# Patient Record
Sex: FEMALE | Race: WHITE
[De-identification: names, ages, dates, MRNs, and addresses within clinical notes are randomized per-mention and may not be internally consistent; named-entity substitution may affect disease eponyms.]

---

## 2019-02-08 ENCOUNTER — HOSPITAL ENCOUNTER (OUTPATIENT)
Dept: HOSPITAL 88 - MAMMO | Age: 69
End: 2019-02-08
Attending: INTERNAL MEDICINE
Payer: COMMERCIAL

## 2019-02-08 DIAGNOSIS — M47.27: ICD-10-CM

## 2019-02-08 DIAGNOSIS — Z12.31: Primary | ICD-10-CM

## 2019-02-08 PROCEDURE — 77067 SCR MAMMO BI INCL CAD: CPT

## 2019-02-08 PROCEDURE — 72110 X-RAY EXAM L-2 SPINE 4/>VWS: CPT

## 2019-02-08 NOTE — DIAGNOSTIC IMAGING REPORT
EXAM: Lumbar spine radiographs - 5 views



INDICATION: Mid/low back pain x 2 months.



COMPARISON: None



FINDINGS:

BONES:

There is diffuse osteopenia.

There is minimal retrolisthesis of L3 on L4.

No acute displaced fractures.

Vertebral body heights are preserved. 



DISCS AND JOINTS:

Mild degenerative disc and facet degenerative changes, most pronounced at

L5-S1. 



SOFT TISSUES:

Atherosclerotic calcifications are noted. 



IMPRESSION:

No acute radiographic abnormality. 



Mild degenerative disc and facet degenerative changes in lower lumbar spine.



Diffuse osteopenia.



Signed by: Dr. Rell Law MD on 2/8/2019 11:13 AM

## 2019-08-23 ENCOUNTER — HOSPITAL ENCOUNTER (OUTPATIENT)
Dept: HOSPITAL 88 - CT | Age: 69
End: 2019-08-23
Attending: INTERNAL MEDICINE
Payer: COMMERCIAL

## 2019-08-23 DIAGNOSIS — R10.11: Primary | ICD-10-CM

## 2019-08-23 PROCEDURE — 74150 CT ABDOMEN W/O CONTRAST: CPT

## 2019-08-23 NOTE — DIAGNOSTIC IMAGING REPORT
EXAM: CT Abdomen without IV contrast



INDICATION: Right upper quadrant pain



COMPARISON: None.



TECHNIQUE: The abdomen scanned utilizing a multidetector helical scanner from

the lung base to the iliac crest without contrast. Coronal and sagittal

reformations were obtained. Routine protocol was performed.



RADIATION DOSE:

Total DLP:  129 mGy*cm



Dose modulation, iterative reconstruction, and/or weight based adjustment of

the mA/kV was utilized to reduce the radiation dose to as low as reasonably

achievable. 



FINDINGS:

LOWER THORAX: The lung bases are clear.



HEPATOBILIARY: No focal hepatic lesions.  No biliary ductal dilatation. The

gallbladder is not visualized



SPLEEN: No splenomegaly.



PANCREAS: No focal masses or ductal dilatation.



ADRENALS: No adrenal nodules.

KIDNEYS/URETERS: No hydronephrosis, stones, or solid mass lesions.





PERITONEUM / RETROPERITONEUM: No free air or fluid.

LYMPH NODES: No lymphadenopathy.

VESSELS: There are moderate atherosclerotic calcifications of the abdominal

aorta and iliac vessels.



GI TRACT: The visualized portions of the gastrointestinal tract demonstrate no

evidence of distention or wall thickening.



BONES AND SOFT TISSUES: Unremarkable.



IMPRESSION: 

No acute findings in the abdomen to explain the patient's right upper quadrant

pain.



Signed by: Stephen E Dreyer, MD on 8/23/2019 10:15 AM

## 2019-08-27 ENCOUNTER — HOSPITAL ENCOUNTER (OUTPATIENT)
Dept: HOSPITAL 88 - OR | Age: 69
Discharge: HOME | End: 2019-08-27
Attending: INTERNAL MEDICINE
Payer: COMMERCIAL

## 2019-08-27 VITALS — SYSTOLIC BLOOD PRESSURE: 153 MMHG | DIASTOLIC BLOOD PRESSURE: 81 MMHG

## 2019-08-27 DIAGNOSIS — K21.9: ICD-10-CM

## 2019-08-27 DIAGNOSIS — Z85.3: ICD-10-CM

## 2019-08-27 DIAGNOSIS — K20.9: ICD-10-CM

## 2019-08-27 DIAGNOSIS — Z88.8: ICD-10-CM

## 2019-08-27 DIAGNOSIS — Z88.6: ICD-10-CM

## 2019-08-27 DIAGNOSIS — Z86.19: ICD-10-CM

## 2019-08-27 DIAGNOSIS — I10: ICD-10-CM

## 2019-08-27 DIAGNOSIS — K29.70: Primary | ICD-10-CM

## 2019-08-27 DIAGNOSIS — K44.9: ICD-10-CM

## 2019-08-27 DIAGNOSIS — Z01.810: ICD-10-CM

## 2019-08-27 DIAGNOSIS — K59.09: ICD-10-CM

## 2019-08-27 PROCEDURE — 93005 ELECTROCARDIOGRAM TRACING: CPT

## 2019-08-27 PROCEDURE — 43239 EGD BIOPSY SINGLE/MULTIPLE: CPT

## 2019-08-27 NOTE — OPERATIVE REPORT
DATE OF PROCEDURE:  08/27/2019

 

SURGEON:  Bravo White MD

 

PROCEDURE:  EGD with biopsies.

 

INDICATIONS FOR EGD:  Upper abdominal pain, bloating.

 

MEDICATIONS:  The patient was done under MAC, please see anesthesiologist's note.

 

PROCEDURE IN DETAIL:  With the patient in left lateral decubitus position, flexible

fiberoptic Olympus gastroscope was introduced into the esophagus under direct

visualization without any difficulty.  There was some patchy erythema noted in distal

esophagus.  The scope was then advanced with ease into the stomach, traversing a small

sliding hiatal hernia.  Mucosa overlying the antrum and the body revealed some patchy

erythema and moderate edema and biopsies were obtained and sent to stain for H. pylori.

The pylorus was intubated with ease and the scope was advanced all the way to the second

portion of the duodenum.  Biopsies were obtained from the second portion and duodenal

bulb to rule out sprue.  The scope was then withdrawn back into the stomach and

retroflexed mucosa overlying the fundus and cardia appeared to be within normal limits.

The scope was then straightened out, it was subsequently withdrawn.  The patient

tolerated the procedure well. 

 

IMPRESSION:  

1. Distal esophagitis.

2. Small sliding hiatal hernia.

3. Gastritis, biopsied, biopsies sent to stain for H. pylori.

4. Rule out sprue.

 

PLAN:  

1. Follow up histology.

2. Initiate Protonix 40 mg one p.o. q.a.m. a.c.

 

 

 

 

______________________________

Bravo White MD

 

[facility] [place]

D:  08/27/2019 13:41:45

T:  08/27/2019 14:25:49

Job #:  [id number]

 

cc:            Marlo Jiménez MD

## 2019-08-27 NOTE — XMS REPORT
Patient Summary Document

                             Created on: 2019



CAROLINE RODRIGUEZ

External Reference #: [id number]

: 1950

Sex: Female



Demographics







                          Address                   [address] 

[place]  [address]

 

                          Home Phone                [phone]

 

                          Preferred Language        Unknown

 

                          Marital Status            Unknown

 

                          Jainism Affiliation     Unknown

 

                          Race                      Unknown

 

                                        Additional Race(s)  

 

                          Ethnic Group              Unknown





Author







                          Author                    [facility]

 

                          Address                   Unknown

 

                          Phone                     Unavailable







Care Team Providers







                    Care Team Member Name    Role                Phone

 

                    BRETT SOLORIO      Unavailable         Unavailable







Problems

This patient has no known problems.



Allergies, Adverse Reactions, Alerts

This patient has no known allergies or adverse reactions.



Medications

This patient has no known medications.



Results







           Test Description    Test Time    Test Comments    Text Results    Atomic Results    Result

 Comments

 

                CT ABDOMEN WO    2019 10:10:00                                                             

                                             [facility]  
                     [address] [id number]  
   Patient Name: CAROLINE RODRIGUEZ                                   MR #: [id number]
                    : 1950                                   Age/Sex: 
69/F  Acct #: [id number]                              Req #: [id number]  [facility] 
Physician:                                                      Ordered by: 
LYRIC SOLORIO MD                            Report #: [id number]        
Location: CT                                      Room/Bed:                     
___________________________________________________________________________________________________
   Procedure: [id number] CT/CT ABDOMEN WO  Exam Date: 19                   
        Exam Time: 0859                                              REPORT 
STATUS: Signed    EXAM: CT Abdomen without IV contrast      INDICATION: Right 
upper quadrant pain      COMPARISON: None.      TECHNIQUE: The abdomen scanned 
utilizing a multidetector helical scanner from   the lung base to the iliac 
crest without contrast. Coronal and sagittal   reformations were obtained. 
Routine protocol was performed.      RADIATION DOSE:   Total DLP:  129 mGy*cm   
  Dose modulation, iterative reconstruction, and/or weight based adjustment of  
the mA/kV was utilized to reduce the radiation dose to as low as reasonably   
achievable.       FINDINGS:   LOWER THORAX: The lung bases are clear.      
HEPATOBILIARY: No focal hepatic lesions.  No biliary ductal dilatation. The   
gallbladder is not visualized      SPLEEN: No splenomegaly.      PANCREAS: No 
focal masses or ductal dilatation.      ADRENALS: No adrenal nodules.   
KIDNEYS/URETERS: No hydronephrosis, stones, or solid mass lesions.         
PERITONEUM / RETROPERITONEUM: No free air or fluid.   LYMPH NODES: No 
lymphadenopathy.   VESSELS: There are moderate atherosclerotic calcifications of
the abdominal   aorta and iliac vessels.      GI TRACT: The visualized portions 
of the gastrointestinal tract demonstrate no   evidence of distention or wall 
thickening.      BONES AND SOFT TISSUES: Unremarkable.      IMPRESSION:    No 
acute findings in the abdomen to explain the patient's right upper quadrant   
pain.      Signed by: Stephen E Dreyer, MD on 2019 10:15 AM        Dictated
By: STEPHEN E DREYER MD  Electronically Signed By: STEPHEN E DREYER MD on 
19 1015  Transcribed By: BUCKY on 19 1015       COPY TO:   
LYRIC SOLORIO MD                         

 

                SP LUMBAR, COMPLETE MIN 4VW    2019 11:09:00                                               

                                                           [address] [place] [address]      Patient Name: CAROLINE RODRIGUEZ                           
       MR #: [id number]                     : 1950                     
             Age/Sex: 68/F  Acct #: [id number]                              
Req #: [id number]  Adm Physician:                                               
      Ordered by: LYRIC SOLORIO MD                            Report #: [id number]
[id number]        Location: [facility]                                   Room/Bed:         
           
___________________________________________________________________________________________________
   Procedure: [id number] DX/SP LUMBAR, COMPLETE MIN 4VW  Exam Date: 19     
                      Exam Time: 0915                                           
  REPORT STATUS: Signed    EXAM: Lumbar spine radiographs - 5 views      INDIC
ATION: Mid/low back pain x 2 months.      COMPARISON: None      FINDINGS:   
BONES:   There is diffuse osteopenia.   There is minimal retrolisthesis of L3 on
L4.   No acute displaced fractures.   Vertebral body heights are preserved.     
 DISCS AND JOINTS:   Mild degenerative disc and facet degenerative changes, most
pronounced at   L5-S1.       SOFT TISSUES:   Atherosclerotic calcifications are 
noted.       IMPRESSION:   No acute radiographic abnormality.       Mild 
degenerative disc and facet degenerative changes in lower lumbar spine.      
Diffuse osteopenia.      Signed by: Dr. Yvrose Genao MD on 2019 11:13 AM     
  Dictated By: YVROSE GENAO MD  Electronically Signed By: YVROSE GENAO MD on 
19 1113  Transcribed By: BUCKY on 19 1113       COPY TO:   
LYRIC SOLORIO MD                         

 

                MAMMOGRAPHY DIGITAL SCR BILAT    2019 10:29:00                                             

                                                             [address] [address]      Patient Name: CAROLINE RODRIGUEZ                           
       MR #: [id number]                     : 1950                     
             Age/Sex: 68/F  Acct #: [id number]                              
Req #: [id number]  Adm Physician:                                               
      Ordered by: LYRIC SOLORIO MD                            Report #: [id number]
[id number]        Location: MAMMO                                   Room/Bed:         
           
___________________________________________________________________________________________________
   Procedure: [id number] MG/MAMMOGRAPHY DIGITAL SCR BILAT  Exam Date: 19   
                        Exam Time: 0900                                         
    REPORT STATUS: Signed       #[id number] - MGSCRBIL   #BILATERAL DIGITAL 
SCREENING MAMMOGRAM WITH CAD: 2019   CLINICAL: Routine screening.        
Comparison is made to exam dated:  2017 mammogram - [facility].     Current study contains 8 films.     The tissue of both 
breasts is predominantly fatty.     Current study was also evaluated with a 
Computer Aided Detection (CAD) system.     There is benign dystrophic 
calcification in the right breast adjacent to the implant.     There also is a 
benign mass in the left breast that is stable.     Both breast implants are 
stable.    No significant masses, calcifications, or other findings are seen in 
either breast.     There has been no significant interval change.      
IMPRESSION: BENIGN   There is no mammographic evidence of malignancy.  A 1 year 
screening mammogram is recommended.    The patient will be notified by letter of
the results.           Mora Barfield Jr., D.O.             cw/:2019 11:01:29
       Imaging Technologist: Davina HERNANDEZ (R)(BRETT), [facility]   letter sent: Compared to Prior B9     Mammogram BI-RADS: 2 
Benign     Dictated By: MORA BARFIELD DO  Electronically Signed By: MORA BARFIELD DO on 19 1101  Transcribed By: NINI on 19 110       COPY 
TO:   LYRIC SOLORIO MD                   

 

                MAMMOGRAPHY DIGITAL SCR BILAT                                        [address] [address]      Patient Name: 
CAROLINE RODRIGUEZ   MR #: [id number]    : 1950 Age/Sex: 67/F  Acct #: 
[id number] Req #: [id number]  Adm Physician:     Ordered by: LYRIC SOLORIO MD
 Report #: [id number]   Location: MAMMO  Room/Bed:     
______________________________________________________________________________
_____________________    Procedure: [id number] MG/MAMMOGRAPHY DIGITAL SCR BILAT  
Exam Date: 17                            Exam Time: 0900       REPORT 
STATUS: Signed    THIS REPORT HAS BEEN AMENDED.     #[id number] - MGSCRBIL   
#BILATERAL DIGITAL SCREENING MAMMOGRAM WITH CAD: 2017   No prior exams were
available for comparison.  The patient states that she has had a prior    
mammogram.  Current study contains 8 films.     The tissue of both breasts is 
predominantly fatty.     Current study was also evaluated with a Computer Aided 
Detection (CAD) system.     There is an oval mass in the left breast central to 
the nipple in the retroareolar region.    This is ill defined mass and only 
visualized on the implant displacement views.  The implants appear    intact.   
No other significant masses, calcifications, or other findings are seen in 
either breast.        IMPRESSION: INCOMPLETE: NEEDS ADDITIONAL IMAGING 
EVALUATION   The oval mass in the left breast is indeterminate.  An ultrasound 
is recommended.     If comparison studies become available then review of these 
may preclude the necessity for    ultrasound evaluation.   The patient will be 
contacted by the Mammography Department to schedule this appointment.           
Mora Barfield Jr., D.O.             cw/:10/8/2017 14:38:42        Imaging 
Technologist: Davina LEE)(BRETT), [facility]   
letter sent: Additional Imaging Needed     Mammogram BI-RADS: 0 Indeterminate   
           AMENDMENT: 10/9/2017   Mora Barfield Jr., D.O.    Comparison to 
outside mammograms dated 2016 from [facility] is now possible as they have 
become    available.  There is no significant interval change from the old 
studies.  The mass in the left    breast seen on current study is unchanged and 
therefore most likely benign. There is no evidence of    malignancy.      
Amended BI-RADS: 2 Benign    letter sent: Compared to Prior B9        Dictated 
By: MORA BARFIELD DO  Electronically Signed By: MORA BARFIELD DO on 10/08/17 14
38  Transcribed By: NINI on 10/09/17 2613       COPY TO:   LYRIC SOLORIO MD

## 2020-03-10 ENCOUNTER — HOSPITAL ENCOUNTER (EMERGENCY)
Dept: HOSPITAL 88 - ER | Age: 70
Discharge: HOME | End: 2020-03-10
Payer: COMMERCIAL

## 2020-03-10 VITALS — HEIGHT: 64 IN | WEIGHT: 122 LBS | BODY MASS INDEX: 20.83 KG/M2

## 2020-03-10 DIAGNOSIS — M79.89: ICD-10-CM

## 2020-03-10 DIAGNOSIS — I10: ICD-10-CM

## 2020-03-10 DIAGNOSIS — M79.642: Primary | ICD-10-CM

## 2020-03-10 PROCEDURE — 99282 EMERGENCY DEPT VISIT SF MDM: CPT

## 2020-05-29 ENCOUNTER — HOSPITAL ENCOUNTER (OUTPATIENT)
Dept: HOSPITAL 88 - OT | Age: 70
LOS: 2 days | End: 2020-05-31
Attending: SPECIALIST
Payer: COMMERCIAL

## 2020-05-29 DIAGNOSIS — S42.202D: Primary | ICD-10-CM

## 2020-05-29 DIAGNOSIS — M25.512: ICD-10-CM

## 2020-05-29 DIAGNOSIS — M25.612: ICD-10-CM

## 2020-06-26 ENCOUNTER — HOSPITAL ENCOUNTER (OUTPATIENT)
Dept: HOSPITAL 88 - MAMMO | Age: 70
End: 2020-06-26
Attending: INTERNAL MEDICINE
Payer: COMMERCIAL

## 2020-06-26 ENCOUNTER — HOSPITAL ENCOUNTER (OUTPATIENT)
Dept: HOSPITAL 88 - OT | Age: 70
LOS: 4 days | End: 2020-06-30
Attending: SPECIALIST
Payer: COMMERCIAL

## 2020-06-26 DIAGNOSIS — M25.512: ICD-10-CM

## 2020-06-26 DIAGNOSIS — Z12.31: Primary | ICD-10-CM

## 2020-06-26 DIAGNOSIS — S42.202D: Primary | ICD-10-CM

## 2020-06-26 DIAGNOSIS — M25.612: ICD-10-CM

## 2020-06-26 PROCEDURE — 77067 SCR MAMMO BI INCL CAD: CPT

## 2020-09-27 ENCOUNTER — HOSPITAL ENCOUNTER (EMERGENCY)
Dept: HOSPITAL 88 - ER | Age: 70
Discharge: HOME | End: 2020-09-27
Payer: COMMERCIAL

## 2020-09-27 VITALS — SYSTOLIC BLOOD PRESSURE: 122 MMHG | DIASTOLIC BLOOD PRESSURE: 74 MMHG

## 2020-09-27 VITALS — WEIGHT: 122 LBS | HEIGHT: 64 IN | BODY MASS INDEX: 20.83 KG/M2

## 2020-09-27 DIAGNOSIS — M54.5: ICD-10-CM

## 2020-09-27 DIAGNOSIS — R30.0: ICD-10-CM

## 2020-09-27 DIAGNOSIS — K21.9: ICD-10-CM

## 2020-09-27 DIAGNOSIS — R50.9: ICD-10-CM

## 2020-09-27 DIAGNOSIS — I10: ICD-10-CM

## 2020-09-27 DIAGNOSIS — N30.90: Primary | ICD-10-CM

## 2020-09-27 LAB
ALBUMIN SERPL-MCNC: 4.5 G/DL (ref 3.5–5)
ALBUMIN/GLOB SERPL: 1.6 {RATIO} (ref 0.8–2)
ALP SERPL-CCNC: 103 IU/L (ref 40–150)
ALT SERPL-CCNC: 12 IU/L (ref 0–55)
ANION GAP SERPL CALC-SCNC: 15.6 MMOL/L (ref 8–16)
BACTERIA URNS QL MICRO: (no result) /HPF
BASOPHILS # BLD AUTO: 0 10*3/UL (ref 0–0.1)
BASOPHILS NFR BLD AUTO: 0.3 % (ref 0–1)
BILIRUB UR QL: NEGATIVE
BUN SERPL-MCNC: 14 MG/DL (ref 7–26)
BUN/CREAT SERPL: 17 (ref 6–25)
CALCIUM SERPL-MCNC: 9.4 MG/DL (ref 8.4–10.2)
CHLORIDE SERPL-SCNC: 103 MMOL/L (ref 98–107)
CLARITY UR: (no result)
CO2 SERPL-SCNC: 28 MMOL/L (ref 22–29)
COLOR UR: YELLOW
DEPRECATED NEUTROPHILS # BLD AUTO: 14.3 10*3/UL (ref 2.1–6.9)
EGFRCR SERPLBLD CKD-EPI 2021: > 60 ML/MIN (ref 60–?)
EOSINOPHIL # BLD AUTO: 0.1 10*3/UL (ref 0–0.4)
EOSINOPHIL NFR BLD AUTO: 0.5 % (ref 0–6)
EPI CELLS URNS QL MICRO: (no result) /LPF
ERYTHROCYTE [DISTWIDTH] IN CORD BLOOD: 13.1 % (ref 11.7–14.4)
FINE GRAN CASTS #/AREA URNS LPF: (no result) /[LPF]
GLOBULIN PLAS-MCNC: 2.8 G/DL (ref 2.3–3.5)
GLUCOSE SERPLBLD-MCNC: 118 MG/DL (ref 74–118)
HCT VFR BLD AUTO: 39 % (ref 34.2–44.1)
HGB BLD-MCNC: 12.7 G/DL (ref 12–16)
KETONES UR QL STRIP.AUTO: NEGATIVE
LEUKOCYTE ESTERASE UR QL STRIP.AUTO: (no result)
LYMPHOCYTES # BLD: 0.6 10*3/UL (ref 1–3.2)
LYMPHOCYTES NFR BLD AUTO: 3.6 % (ref 18–39.1)
LYMPHOCYTES NFR BLD MANUAL: 4 % (ref 19–48)
MCH RBC QN AUTO: 29.5 PG (ref 28–32)
MCHC RBC AUTO-ENTMCNC: 32.6 G/DL (ref 31–35)
MCV RBC AUTO: 90.7 FL (ref 81–99)
MONOCYTES # BLD AUTO: 0.3 10*3/UL (ref 0.2–0.8)
MONOCYTES NFR BLD AUTO: 2.2 % (ref 4.4–11.3)
MONOCYTES NFR BLD MANUAL: 3 % (ref 3.4–9)
MUCOUS THREADS URNS QL MICRO: (no result)
NEUTS BAND NFR BLD MANUAL: 2 %
NEUTS SEG NFR BLD AUTO: 92.9 % (ref 38.7–80)
NEUTS SEG NFR BLD MANUAL: 91 % (ref 40–74)
NITRITE UR QL STRIP.AUTO: NEGATIVE
PLAT MORPH BLD: NORMAL
PLATELET # BLD AUTO: 215 X10E3/UL (ref 140–360)
PLATELET # BLD EST: ADEQUATE 10*3/UL
POTASSIUM SERPL-SCNC: 3.6 MMOL/L (ref 3.5–5.1)
PROT UR QL STRIP.AUTO: (no result)
RBC # BLD AUTO: 4.3 X10E6/UL (ref 3.6–5.1)
SODIUM SERPL-SCNC: 143 MMOL/L (ref 136–145)
SP GR UR STRIP: 1.02 (ref 1.01–1.02)
UROBILINOGEN UR STRIP-MCNC: 2 MG/DL (ref 0.2–1)
WBC #/AREA URNS HPF: >50 /HPF (ref 0–5)

## 2020-09-27 PROCEDURE — 87086 URINE CULTURE/COLONY COUNT: CPT

## 2020-09-27 PROCEDURE — 83605 ASSAY OF LACTIC ACID: CPT

## 2020-09-27 PROCEDURE — 85025 COMPLETE CBC W/AUTO DIFF WBC: CPT

## 2020-09-27 PROCEDURE — 36415 COLL VENOUS BLD VENIPUNCTURE: CPT

## 2020-09-27 PROCEDURE — 74176 CT ABD & PELVIS W/O CONTRAST: CPT

## 2020-09-27 PROCEDURE — 87186 SC STD MICRODIL/AGAR DIL: CPT

## 2020-09-27 PROCEDURE — 87040 BLOOD CULTURE FOR BACTERIA: CPT

## 2020-09-27 PROCEDURE — 81001 URINALYSIS AUTO W/SCOPE: CPT

## 2020-09-27 PROCEDURE — 99284 EMERGENCY DEPT VISIT MOD MDM: CPT

## 2020-09-27 PROCEDURE — 80053 COMPREHEN METABOLIC PANEL: CPT

## 2020-09-27 NOTE — DIAGNOSTIC IMAGING REPORT
EXAM: CT Abdomen and Pelvis WITHOUT contrast  

INDICATION:      

^MID BACK PAIN

^[id number]

^2145 

COMPARISON: CT dated 8/23/2019

TECHNIQUE: Abdomen and pelvis were scanned utilizing a multidetector helical

scanner from the lung base to the pubic symphysis without administration of IV

contrast. Absence of intravenous contrast decreases sensitivity for detection

of focal lesions and vascular pathology. Coronal and sagittal reformations were

obtained. Routine protocol was performed. 

     IV CONTRAST: None

     ORAL CONTRAST: Water

            

COMPLICATIONS: None



RADIATION DOSE:

     Total DLP: 163.08 mGy*cm

     Estimated effective dose: (DLP x 0.015 x size factor) mSv

     CTDIvol has been reviewed. It is below the limits set by the Radiation

Protocol Committee (RPC).



FINDINGS:



LINES and TUBES: None.



LOWER THORAX:  Unremarkable. Mild anterior right lobe atelectasis/scarring.



HEPATOBILIARY: Unenhanced liver is unremarkable. No biliary ductal dilation. 

 

GALLBLADDER: Not clearly visualized.



SPLEEN: No splenomegaly. 



PANCREAS: No focal masses or ductal dilatation.  



ADRENALS: No adrenal nodules    



KIDNEYS/URETERS:  No hydronephrosis. Limited for evaluation of renal parenchyma

without intravenous contrast. No stones.



GI TRACT: No abnormal distention, wall thickening, or evidence of bowel

obstruction.       Appendix is normal. Large colonic stool burden, suggestive

of constipation.



PELVIC ORGANS/BLADDER: Mild bladder wall thickening and perivesical fat

stranding. Numerous pelvic fullness.



LYMPH NODES: No lymphadenopathy.



VESSELS: There is mild atherosclerotic disease in the aorta and major arterial

branches.



PERITONEUM / RETROPERITONEUM: No free air or fluid.



BONES: Scattered sclerotic foci, likely bone islands.



SOFT TISSUES: Partially seen bilateral breast implants.            



IMPRESSION: 

1.  Limited study without intravenous contrast.

2.  Mild bladder wall thickening and perivesical fat stranding, concerning for

cystitis.

3.  No nephrolithiasis or evidence of obstructive urolithiasis.



Signed by: Dr. Darrell Hidalgo MD on 9/27/2020 10:34 PM

## 2020-09-27 NOTE — XMS REPORT
Continuity of Care Document

                             Created on: 2020



RODRIGUEZCAROLINE RUBY

External Reference #: [id number]

: 1950

Sex: Female



Demographics





                          Address                   [address]

[place]  [address]

 

                          Home Phone                [phone]

 

                          Preferred Language        Unknown

 

                          Marital Status            Unknown

 

                          Judaism Affiliation     Unknown

 

                          Race                      Unknown

 

                                        Additional Race(s)  

 

                          Ethnic Group              Unknown





Author





                          Author                    [facility]

t

 

                          Organization              [facility]

 

                          Address                   [address]

[place]  [address]



 

                          Phone                     Unavailable







Support





                Name            Relationship    Address         Phone

 

                    MIRIAM RODRIGUEZ     ECON                [address]

[address]

[place]  [address]                     Unavailable







Care Team Providers





                    Care Team Member Name Role                Phone

 

                    MD BRETT SOLORIO MD PCP                 [phone]

 

                    BRETT SOLORIO      Attphys             Unavailable







Payers





           Payer Name Policy Type Policy Number Effective Date Expiration Date OLIVER granda

 

           [facility]            [id number] 2015 00:00:00     

       [facility]







Problems





           Condition Name Condition Details Condition Category Status     Onset 

Date Resolution

Date            Last Treatment Date Treating Clinician Comments        Source

 

       Problem        Condition Active                                    [facility]







Allergies, Adverse Reactions, Alerts





        Allergy Name Allergy Type Status  Severity Reaction(s) Onset Date Inacti

ve Date 

Treating Clinician        Comments                  Source

 

       Tramadol Allergy to substance Active        itching 2020-03-10 00:00:00  

                    [facility]

 

        Streptomycin Allergy to substance Active          anaphylaxis 2019

 00:00:00                  

                                        [facility]

 

       Iodine Allergy to substance Active               2019 00:00:00     

                 [facility]

 

       Codeine Allergy to substance Active               2019 00:00:00    

                  [facility]







Social History





           Social Habit Start Date Stop Date  Quantity   Comments   Source

 

           Sex Assigned At Birth 1950 00:00:00 1950 00:00:00 Female 

               [facility]







Medications





             Ordered Medication Name Filled Medication Name Start Date   Stop Da

te    Current 

Medication? Ordering Clinician Indication Dosage     Frequency  Signature (SIG) 

Comments                  Components                Source

 

     Amlodipine Besylate Amlodipine Besylate           Yes            5         

Daily           [facility]

 

     Metoprolol Metoprolol           Yes            25        Bedtime           

[facility]







Vital Signs





             Vital Name   Observation Time Observation Value Comments     Source

 

             Body Temperature 2019 13:31:00 97.8 [degF]               [facility]







Procedures

This patient has no known procedures.



Encounters





             Start Date/Time End Date/Time Encounter Type Admission Type [facility]   Care Department Encounter ID    Source

 

          2020 09:10:00 2020 23:59:00 Discharged Recurring          

           [facility] [id number]              [facility] Patients [facility]

 

          2020 10:26:00 2020 23:59:00 Discharged Recurring          

           [facility] [id number]              [facility] Patients [facility]

 

          2020-03-10 18:41:00 2020-03-10 21:00:00 Departed Emergency Room       

              [facility] [id number]              [facility] Patients [facility]

 

          2019 10:52:00 2019 10:52:00 Registered Surgical Day Care  

                   [facility] [id number]              [facility]

 

          2019 08:18:00 2019 08:18:00 Registered Clinic 3         LYRIC DUMONT [facility] [id number]              [facility]







Results





           Test Description Test Time  Test Comments Results    Result Comments 

Source

 

                MAMMOGRAPHY DIGITAL SCR BILAT 2020 09:03:00               

                              

                                                          [facility]                        [address] [place] [address]      Patient Name: CAROLINE RODRIGUEZ                           
     MR #: [id number]                  : 1950                          
         Age/Sex: 70/F  Acct #: [id number]                              Req #:
 [id number]  Adm Physician:                                                     
 Ordered by: LYRIC SOLORIO MD                         Report #: [id number]      
  Location: [facility]                                   Room/Bed:                   
________________________________________________________________________________

___________________    Procedure: [id number] MG/MAMMOGRAPHY DIGITAL SCR BILAT  
Exam Date: 20                            Exam Time: 822                  
                            REPORT STATUS: Signed       #[id number] - 
MGSCRBIL   #BILATERAL DIGITAL SCREENING MAMMOGRAM WITH CAD: 2020   
CLINICAL: Routine screening.        Comparison is made to exam dated:  2019 
mammogram - [facility].     The tissue of both breasts 
is predominantly fatty.     Current study was also evaluated with a Computer 
Aided Detection (CAD) system.     Bilateral breast implants are stable.     No 
significant masses, calcifications, or other findings are seen in either breast.
     There has been no significant interval change.      IMPRESSION: NEGATIVE   
There is no mammographic evidence of malignancy.  A 1 year screening mammogram 
is recommended.    The patient will be notified by letter of the results.       
    BOSSMAN GONSALEZ M.D.             kw/nini:2020 09:58:23        Imaging 
Technologist: Davina HERNANDEZ(R)(M), [facility]   
letter sent: Compared to Prior B9     Mammogram BI-RADS: 1 Negative     Dictated
 By: BOSSMAN GONSALEZ MD  Electronically Signed By: BOSSMAN GONSALEZ MD on 20  
Transcribed By: NINI on 20       COPY TO:   LYRIC SOLORIO MD       
                                                     

 

                CT ABDOMEN WO   2019 10:10:00                             

                                

                                          [place] [address]    
  Patient Name: CAROLINE RODRIGUEZ                                   MR #: [id number] 
                    : 1950                                   Age/Sex: 
69/F  Acct #: [id number]                              Req #: [id number]  Adm 
Physician:                                                      Ordered by: 
LYRIC SOLORIO MD                            Report #: [id number]        
Location: CT                                      Room/Bed:                     
________________________________________________________________________________

___________________    Procedure: [id number] CT/CT ABDOMEN WO  Exam Date: 
19                            Exam Time: 859                             
                 REPORT STATUS: Signed    EXAM: CT Abdomen without IV contrast  
    INDICATION: Right upper quadrant pain      COMPARISON: None.      TECHNIQUE:
 The abdomen scanned utilizing a multidetector helical scanner from   the lung 
base to the iliac crest without contrast. Coronal and sagittal   reformations 
were obtained. Routine protocol was performed.      RADIATION DOSE:   Total DLP:
  129 mGy*cm      Dose modulation, iterative reconstruction, and/or weight based
 adjustment of   the mA/kV was utilized to reduce the radiation dose to as low 
as reasonably   achievable.       FINDINGS:   LOWER THORAX: The lung bases are 
clear.      HEPATOBILIARY: No focal hepatic lesions.  No biliary ductal dilata
tion. The   gallbladder is not visualized      SPLEEN: No splenomegaly.      
PANCREAS: No focal masses or ductal dilatation.      ADRENALS: No adrenal 
nodules.   KIDNEYS/URETERS: No hydronephrosis, stones, or solid mass lesions.   
      PERITONEUM / RETROPERITONEUM: No free air or fluid.   LYMPH NODES: No 
lymphadenopathy.   VESSELS: There are moderate atherosclerotic calcifications of
 the abdominal   aorta and iliac vessels.      GI TRACT: The visualized portions
 of the gastrointestinal tract demonstrate no   evidence of distention or wall 
thickening.      BONES AND SOFT TISSUES: Unremarkable.      IMPRESSION:    No 
acute findings in the abdomen to explain the patient's right upper quadrant   
pain.      Signed by: Stephen E Dreyer, MD on 2019 10:15 AM        Dictated
 By: STEPHEN E DREYER MD  Electronically Signed By: STEPHEN E DREYER MD on 
19 1015  Transcribed By: BUCKY on 19 1015       COPY TO:   
LYRIC SOLORIO MD                                     

 

                SP LUMBAR, COMPLETE MIN 4VW 2019 11:09:00                 

                              

                                                        [place] [address]      Patient Name: CAROLINE RODRIGUEZ                           
        MR #: [id number]                     : 1950                    
               Age/Sex: 68/F  Acct #: [id number]                              
Req #: [id number]  Adm Physician:                                               
       Ordered by: LYRIC SOLORIO MD                            Report #: [id number]
[id number]        Location: [facility]                                   Room/Bed:         
            
________________________________________________________________________________

___________________    Procedure: [id number] DX/SP LUMBAR, COMPLETE MIN 4VW  Exam
 Date: 19                            Exam Time: 915                      
                        REPORT STATUS: Signed    EXAM: Lumbar spine radiographs 
- 5 views      INDICATION: Mid/low back pain x 2 months.      COMPARISON: None  
    FINDINGS:   BONES:   There is diffuse osteopenia.   There is minimal 
retrolisthesis of L3 on L4.   No acute displaced fractures.   Vertebral body 
heights are preserved.       DISCS AND JOINTS:   Mild degenerative disc and 
facet degenerative changes, most pronounced at   L5-S1.       SOFT TISSUES:   
Atherosclerotic calcifications are noted.       IMPRESSION:   No acute 
radiographic abnormality.       Mild degenerative disc and facet degenerative 
changes in lower lumbar spine.      Diffuse osteopenia.      Signed by: Dr. Yvrose Genao MD on 2019 11:13 AM        Dictated By: YVROSE GENAO MD  
Electronically Signed By: YVROSE GENAO MD on 19 1113  Transcribed By: 
BUCKY on 19 1113       COPY TO:   LYRIC SOLORIO MD                      

              

 

                MAMMOGRAPHY DIGITAL SCR BILAT 2019 10:29:00               

                              

                                                          [place] [address]      Patient Name: CAROLINE RODRIGUEZ                           
        MR #: [id number]                     : 1950                    
               Age/Sex: 68/F  Acct #: [id number]                              
Req #: [id number]  Adm Physician:                                               
       Ordered by: LYRIC SOLORIO MD                            Report #: [id number]
[id number]        Location: [facility]                                   Room/Bed:         
            
________________________________________________________________________________

___________________    Procedure: [id number] MG/MAMMOGRAPHY DIGITAL SCR BILAT  
Exam Date: 19                            Exam Time: 0900                  
                            REPORT STATUS: Signed       #[id number] - 
MGSCRBIL   #BILATERAL DIGITAL SCREENING MAMMOGRAM WITH CAD: 2019   CLINICAL:
 Routine screening.        Comparison is made to exam dated:  2017 
mammogram - [facility].     Current study contains 8 fi
lms.     The tissue of both breasts is predominantly fatty.     Current study 
was also evaluated with a Computer Aided Detection (CAD) system.     There is 
benign dystrophic calcification in the right breast adjacent to the implant.    
 There also is a benign mass in the left breast that is stable.     Both breast 
implants are stable.    No significant masses, calcifications, or other findings
 are seen in either breast.     There has been no significant interval change.  
    IMPRESSION: BENIGN   There is no mammographic evidence of malignancy.  A 1 
year screening mammogram is recommended.    The patient will be notified by 
letter of the results.           Mora Barfield Jr., D.O.             
cw/:2019 11:01:29        Imaging Technologist: Davina HERNANDEZ(R)(BRETT), [facility]   letter sent: Compared to Prior B9     
Mammogram BI-RADS: 2 Benign     Dictated By: MORA BARFIELD DO  Electronically 
Signed By: MORA BARFIELD DO on 19 1101  Transcribed By: NINI on 19
 1101       COPY TO:   LYRIC SOLORIO MD                                     

 

                MAMMOGRAPHY DIGITAL SCR BILAT                                   

  [place] [address]      Patient Name: 
CAROLINE RODRIGUEZ   MR #: [id number]    : 1950 Age/Sex: 67/F  Acct #: 
[id number] Req #: [id number]  Adm Physician:     Ordered by: LYRIC SOLORIO MD
  Report #: [id number]   Location: MAMMO  Room/Bed:     
______________________________________________________________________________
_____________________    Procedure: [id number] MG/MAMMOGRAPHY DIGITAL SCR BILAT  
Exam Date: 17                            Exam Time: 0900       REPORT 
STATUS: Signed    THIS REPORT HAS BEEN AMENDED.     #[id number] - MGSCRBIL   
#BILATERAL DIGITAL SCREENING MAMMOGRAM WITH CAD: 2017   No prior exams were
 available for comparison.  The patient states that she has had a prior    
mammogram.  Current study contains 8 films.     The tissue of both breasts is 
predominantly fatty.     Current study was also evaluated with a Computer Aided 
Detection (CAD) system.     There is an oval mass in the left breast central to 
the nipple in the retroareolar region.    This is ill defined mass and only 
visualized on the implant displacement views.  The implants appear    intact.   
 No other significant masses, calcifications, or other findings are seen in 
either breast.        IMPRESSION: INCOMPLETE: NEEDS ADDITIONAL IMAGING 
EVALUATION   The oval mass in the left breast is indeterminate.  An ultrasound 
is recommended.     If comparison studies become available then review of these 
may preclude the necessity for    ultrasound evaluation.   The patient will be 
contacted by the Mammography Department to schedule this appointment.           
Mora Barfield Jr., D.O.             cw/:10/8/2017 14:38:42        Imaging 
Technologist: Davina LEE)(BRETT), [facility]   
letter sent: Additional Imaging Needed     Mammogram BI-RADS: 0 Indeterminate   
            AMENDMENT: 10/9/2017   Mroa Barfield Jr., D.O.    Comparison to 
outside mammograms dated 2016 from [facility] is now possible as they have 
become    available.  There is no significant interval change from the old 
studies.  The mass in the left    breast seen on current study is unchanged and 
therefore most likely benign. There is no evidence of    malignancy.      
Amended BI-RADS: 2 Benign    letter sent: Compared to Prior B9        Dictated 
By: MORA BARFIELD DO  Electronically Signed By: MORA BARFIELD DO on 10/08/17 14
38  Transcribed By: NINI on 10/09/17 1356       COPY TO:   LYRIC SOLORIO MD

## 2020-09-27 NOTE — EMERGENCY DEPARTMENT NOTE
History of Present Illnes


History of Present Illness


Chief Complaint:  Sepsis


History of Present Illness


This is a 70 year old  female PRESENTS TO THE ER C/O CHILLS, BURNING 

WITH URINATION AND


   MID BACK PAIN ONSET YESTERDAY; PT ALSO REPORTS URINARY FREQUENCY; PT FEBRILE 

   IN


   TRIAGE, 102.5; PT DENIES FEVER AT HOME .


Historian:  Patient


Arrival Mode:  Car


Onset (how long ago):  day(s) (1)


Location:  SUPRA PUBIC AND BILATERAL MID BACK


Quality:  PAIN


Radiation:  Reports back (BILATERAL FLANK)


Severity:  moderate


Onset quality:  gradual


Duration (how long):  day(s) (1)


Timing of current episode:  constant


Progression:  worsening


Chronicity:  new


Context:  Denies recent illness, Denies recent surgery, Denies trauma/injury


Relieving factors:  none


Exacerbating factors:  none


Associated symptoms:  Reports denies other symptoms


Treatments prior to arrival:  none





Past Medical/Family History


Physician Review


I have reviewed the patient's past medical and family history.  Any updates have

been documented here.





Past Medical History


Recent Fever:  Yes


Clinical Suspicion of Infectio:  Yes


New/Unexplained Change in Ment:  No


Past Medical History:  Hypertension, GERD


Other Medical History:  


IRREGULAR HEART BEAT


Past Surgical History:  Cholecysctectomy, Hysterectomy, Mastectomy, Cataract 

Removal


Other Surgery:  


Bilateral mastectomy





Social History


Smoking Cessation:  Former smoker


Alcohol Use:  None


Any Illegal Drug Use:  No





Family History


Family history of heart diseas:  No





Other


Last Tetanus:  UTD





Review of Systems


Review of Systems


Constitutional:  Reports no symptoms


EENTM:  Reports no symptoms


Cardiovascular:  Reports no symptoms


Respiratory:  Reports no symptoms


Gastrointestinal:  Reports no symptoms


Genitourinary:  Reports as per HPI


Musculoskeletal:  Reports no symptoms


Integumentary:  Reports no symptoms


Neurological:  Reports no symptoms


Psychological:  Reports no symptoms


Endocrine:  Reports no symptoms


Hematological/Lymphatic:  Reports no symptoms





Physical Exam


Related Data


Allergies:  


Coded Allergies:  


     codeine (Verified  Allergy, Unknown, 8/23/19)


     iodine (Verified  Allergy, Unknown, 8/23/19)


     streptomycin (Verified  Allergy, Unknown, anaphylaxis, 8/27/19)


     tramadol (Verified  Allergy, Unknown, itching, 3/10/20)


Triage Vital Signs





Vital Signs








  Date Time  Temp Pulse Resp B/P (MAP) Pulse Ox O2 Delivery O2 Flow Rate FiO2


 


9/27/20 21:16 102.5 93 20 119/73 100 Room Air  








Vital signs reviewed:  Yes





Physical Exam


CONSTITUTIONAL





Constitutional:  Present well-developed, Present well-nourished


HENT


HENT:  Present normocephalic, Present atraumatic, Present oropharynx 

clear/moist, Present nose normal


HENT L/R:  Present left ext ear normal, Present right ext ear normal


EYES





Eyes:  Reports PERRL, Reports conjunctivae normal


NECK


Neck:  Present ROM normal


PULMONARY


Pulmonary:  Present effort normal, Present breath sounds normal


CARDIOVASCULAR





Cardiovascular:  Present regular rhythm, Present heart sounds normal, Present 

capillary refill normal, Present normal rate


GASTROINTESTINAL





Abdominal:  Present soft, Present bowel sounds normal, Present tender (SUPRA 

PUBIC); 


   Absent left CVA tenderness, Absent right CVA tenderness


GENITOURINARY





Genitourinary:  Present exam deferred


SKIN


Skin:  Present warm, Present dry


MUSCULOSKELETAL





Musculoskeletal:  Present ROM normal


NEUROLOGICAL





Neurological:  Present alert, Present oriented x 3, Present no gross motor or 

sensory deficits


PSYCHOLOGICAL


Psychological:  Present mood/affect normal, Present judgement normal





Results


Laboratory


Laboratory





Laboratory Tests








Test


 9/27/20


21:28 9/27/20


21:23


 


Urine Color


 Yellow


(YELLOW) 





 


Urine Clarity Hazy (CLEAR)  


 


Urine pH 7 (5 - 7)  


 


Urine Specific Gravity


 1.020


(1.010-1.025) 





 


Urine Protein 2+ (NEGATIVE)  


 


Urine Glucose (UA)


 Negative


(NEGATIVE) 





 


Urine Ketones


 Negative


(NEGATIVE) 





 


Urine Blood


 Moderate


(NEGATIVE) 





 


Urine Nitrite


 Negative


(NEGATIVE) 





 


Urine Bilirubin


 Negative


(NEGATIVE) 





 


Urine Urobilinogen


 2.0 mg/dL (0.2


- 1) 





 


Urine Leukocyte Esterase


 Moderate


(NEGATIVE) 





 


Urine RBC


 11-20 /HPF


(0-5) 





 


Urine WBC >50 /HPF (0-5)  


 


Urine Epithelial Cells


 Few /LPF


(NONE) 





 


Urine Amorphous Sediment Few (FEW)  


 


Urine Bacteria


 Moderate /HPF


(NONE) 





 


Urine Fine Granular Casts 1-5 (0)  


 


Urine Mucus Few (RARE)  


 


White Blood Count


 


 15.39 x10e3/uL


(4.8-10.8)


 


Red Blood Count


 


 4.30 x10e6/uL


(3.6-5.1)


 


Hemoglobin


 


 12.7 g/dL


(12.0-16.0)


 


Hematocrit


 


 39.0 %


(34.2-44.1)


 


Mean Corpuscular Volume


 


 90.7 fL


(81-99)


 


Mean Corpuscular Hemoglobin


 


 29.5 pg


(28-32)


 


Mean Corpuscular Hemoglobin


Concent 


 32.6 g/dL


(31-35)


 


Red Cell Distribution Width


 


 13.1 %


(11.7-14.4)


 


Platelet Count


 


 215 x10e3/uL


(140-360)


 


Neutrophils (%) (Auto)


 


 92.9 %


(38.7-80.0)


 


Lymphocytes (%) (Auto)


 


 3.6 %


(18.0-39.1)


 


Monocytes (%) (Auto)


 


 2.2  %


(4.4-11.3)


 


Eosinophils (%) (Auto)


 


 0.5 %


(0.0-6.0)


 


Basophils (%) (Auto)


 


 0.3 %


(0.0-1.0)


 


Neutrophils # (Auto)  14.3 (2.1-6.9) 


 


Lymphocytes # (Auto)  0.6 (1.0-3.2) 


 


Monocytes # (Auto)  0.3 (0.2-0.8) 


 


Eosinophils # (Auto)  0.1 (0.0-0.4) 


 


Basophils # (Auto)  0.0 (0.0-0.1) 


 


Absolute Immature Granulocyte


(auto 


 0.07 x10e3/uL


(0-0.1)


 


Differential Total Cells


Counted 


 100 





 


Neutrophils % (Manual)  91 % (40-74) 


 


Band Neutrophils %  2 % 


 


Lymphocytes % (Manual)  4 % (19-48) 


 


Monocytes % (Manual)  3 % (3.4-9.0) 


 


Platelet Estimate  Adequate 


 


Platelet Morphology Comment  Normal 


 


Sodium Level


 


 143 mmol/L


(136-145)


 


Potassium Level


 


 3.6 mmol/L


(3.5-5.1)


 


Chloride Level


 


 103 mmol/L


()


 


Carbon Dioxide Level


 


 28 mmol/L


(22-29)


 


Anion Gap


 


 15.6 mmol/L


(8-16)


 


Blood Urea Nitrogen


 


 14 mg/dL


(7-26)


 


Creatinine


 


 0.83 mg/dL


(0.57-1.11)


 


Estimat Glomerular Filtration


Rate 


 > 60 ML/MIN


(60-)


 


BUN/Creatinine Ratio  17 (6-25) 


 


Glucose Level


 


 118 mg/dL


()


 


Lactic Acid Level


 


 1.0 mmol/L


(0.5-2.0)


 


Calcium Level


 


 9.4 mg/dL


(8.4-10.2)


 


Total Bilirubin


 


 0.6 mg/dL


(0.2-1.2)


 


Aspartate Amino Transf


(AST/SGOT) 


 21 IU/L (5-34) 





 


Alanine Aminotransferase


(ALT/SGPT) 


 12 IU/L (0-55) 





 


Alkaline Phosphatase


 


 103 IU/L


()


 


Total Protein


 


 7.3 g/dL


(6.5-8.1)


 


Albumin


 


 4.5 g/dL


(3.5-5.0)


 


Globulin


 


 2.8 g/dL


(2.3-3.5)


 


Albumin/Globulin Ratio  1.6 (0.8-2.0) 








Laboratory Tests








Test


 9/27/20


21:23








Lab results reviewed:  Yes





Imaging


Imaging results reviewed:  Yes


Impressions


Procedure: [id number] CT/CT ABDOMEN/PELVIS WO


Exam Date: 09/27/20                            Exam Time: 2145








                              REPORT STATUS: Signed





EXAM: CT Abdomen and Pelvis WITHOUT contrast  


INDICATION:      


^MID BACK PAIN


^[id number]


^2145 


COMPARISON: CT dated 8/23/2019


TECHNIQUE: Abdomen and pelvis were scanned utilizing a multidetector helical


scanner from the lung base to the pubic symphysis without administration of IV


contrast. Absence of intravenous contrast decreases sensitivity for detection


of focal lesions and vascular pathology. Coronal and sagittal reformations were


obtained. Routine protocol was performed. 


     IV CONTRAST: None


     ORAL CONTRAST: Water


            


COMPLICATIONS: None





RADIATION DOSE:


     Total DLP: 163.08 mGy*cm


     Estimated effective dose: (DLP x 0.015 x size factor) mSv


     CTDIvol has been reviewed. It is below the limits set by the Radiation


Protocol Committee (RPC).





FINDINGS:





LINES and TUBES: None.





LOWER THORAX:  Unremarkable. Mild anterior right lobe atelectasis/scarring.





HEPATOBILIARY: Unenhanced liver is unremarkable. No biliary ductal dilation. 


 


GALLBLADDER: Not clearly visualized.





SPLEEN: No splenomegaly. 





PANCREAS: No focal masses or ductal dilatation.  





ADRENALS: No adrenal nodules    





KIDNEYS/URETERS:  No hydronephrosis. Limited for evaluation of renal parenchyma


without intravenous contrast. No stones.





GI TRACT: No abnormal distention, wall thickening, or evidence of bowel


obstruction.       Appendix is normal. Large colonic stool burden, suggestive


of constipation.





PELVIC ORGANS/BLADDER: Mild bladder wall thickening and perivesical fat


stranding. Numerous pelvic fullness.





LYMPH NODES: No lymphadenopathy.





VESSELS: There is mild atherosclerotic disease in the aorta and major arterial


branches.





PERITONEUM / RETROPERITONEUM: No free air or fluid.





BONES: Scattered sclerotic foci, likely bone islands.





SOFT TISSUES: Partially seen bilateral breast implants.            





IMPRESSION: 


1.  Limited study without intravenous contrast.


2.  Mild bladder wall thickening and perivesical fat stranding, concerning for


cystitis.


3.  No nephrolithiasis or evidence of obstructive urolithiasis.





Signed by: Dr. Darrell Hidalgo MD on 9/27/2020 10:34 PM








Dictated By: DARRELL HIDALGO MD


Electronically Signed By: DARRELL HIDALGO MD on 09/27/20 2234


Transcribed By: BUCKY on 09/27/20 2234 








COPY TO:   CONNIE FRENCH MD~





Assessment & Plan


Medical Decision Making


MDM


PT WITH FEVER, DYSURIA, BILATERAL FLANK PAIN





CBC, CMP, LACTIC ACID, UA, BLOOD CULTURE, URINE CULTURE CT ABD/PELVIS ORDERED TO

EVAL FOR SEPSIS, ELECTROLYTE ABNORMALITY, PYELONEPHRITIS, UTI, KIDNEY STONE





TYLENOL 975 MG PO ORDERED


1 LITER NS IV BOLUS ORDERED


ZOFRAN 4 MG IV ORDERED


ROCEPHIN 1 GRAM IV ORDERED





Reassessment


Reassessment time:  22:46


Reassessment


pt states she feels good and want to go home. pt stable for discharge





Assessment & Plan


Final Impression:  


(1) Cystitis


Depart Disposition:  HOME, SELF-CARE


Last Vital Signs











  Date Time  Temp Pulse Resp B/P (MAP) Pulse Ox O2 Delivery O2 Flow Rate FiO2


 


9/27/20 21:38  85 20 134/70 100 Room Air  


 


9/27/20 21:16 102.5       








Home Meds


Reported Medications


[Metoprolol]   No Conflict Check, 25 MG PO HS


   8/23/19


Amlodipine Besylate (AMLODIPINE BESYLATE) 5 Mg Tablet, 5 MG PO DAILY, #30 TAB


   8/23/19


Medications in the ED





Acetaminophen 975 mg ONCE  ONCE PO  Last administered on 9/27/20at 21:28; Admin 

Dose 975 MG;  Start 9/27/20 at 21:30;  Stop 9/27/20 at 21:31;  Status DC


Sodium Chloride 1,000 ml @  999 mls/hr Q1H1M IV  Last administered on 9/27/20at 

21:35; Admin Dose 999 MLS/HR;  Start 9/27/20 at 21:30;  Stop 10/27/20 at 21:29


Ceftriaxone Sodium 50 ml @  100 mls/hr ONCE  ONCE IV  Last administered on 

9/27/20at 21:36; Admin Dose 100 MLS/HR;  Start 9/27/20 at 21:30;  Stop 9/27/20 

at 21:59


Acetaminophen 975 mg STK-MED ONCE .ROUTE ;  Start 9/27/20 at 21:32;  Stop 

9/27/20 at 21:27;  Status DC


Sodium Chloride 1,000 ml @  ud STK-MED ONCE .ROUTE ;  Start 9/27/20 at 21:33;  

Stop 9/27/20 at 21:27;  Status DC


Ceftriaxone Sodium 50 ml @ ud STK-MED ONCE IV ;  Start 9/27/20 at 21:33;  Stop 

9/27/20 at 21:27;  Status DC


Ondansetron HCl 4 mg NOW  STAT IV ;  Start 9/27/20 at 21:27;  Stop 9/27/20 at 

21:29;  Status DC











CONNIE FRENCH MD        Sep 27, 2020 21:41

## 2021-03-24 ENCOUNTER — HOSPITAL ENCOUNTER (OUTPATIENT)
Dept: HOSPITAL 88 - VACCPMC | Age: 71
End: 2021-03-24
Payer: COMMERCIAL

## 2021-03-24 DIAGNOSIS — Z23: Primary | ICD-10-CM

## 2021-03-24 DIAGNOSIS — Z20.822: ICD-10-CM

## 2021-03-24 PROCEDURE — 91301: CPT

## 2021-03-24 PROCEDURE — 0011A: CPT

## 2021-04-21 ENCOUNTER — HOSPITAL ENCOUNTER (OUTPATIENT)
Dept: HOSPITAL 88 - VACCPMC | Age: 71
Discharge: HOME | End: 2021-04-21
Payer: COMMERCIAL

## 2021-04-21 DIAGNOSIS — Z23: Primary | ICD-10-CM

## 2021-04-21 DIAGNOSIS — Z20.822: ICD-10-CM

## 2021-04-21 PROCEDURE — 91301: CPT
